# Patient Record
Sex: FEMALE | NOT HISPANIC OR LATINO | Employment: FULL TIME | URBAN - METROPOLITAN AREA
[De-identification: names, ages, dates, MRNs, and addresses within clinical notes are randomized per-mention and may not be internally consistent; named-entity substitution may affect disease eponyms.]

---

## 2023-08-31 PROBLEM — N92.6 IRREGULAR MENSES: Status: ACTIVE | Noted: 2023-08-31

## 2023-08-31 PROBLEM — N94.6 DYSMENORRHEA: Status: ACTIVE | Noted: 2023-08-31

## 2023-08-31 PROBLEM — N92.0 MENORRHAGIA WITH REGULAR CYCLE: Status: ACTIVE | Noted: 2023-08-31

## 2023-08-31 PROBLEM — N92.1 BREAKTHROUGH BLEEDING ON OCPS: Status: ACTIVE | Noted: 2023-08-31

## 2023-08-31 RX ORDER — NORETHINDRONE ACETATE AND ETHINYL ESTRADIOL .02; 1 MG/1; MG/1
1 TABLET ORAL DAILY
COMMUNITY
Start: 2022-10-25

## 2023-08-31 RX ORDER — SPIRONOLACTONE 100 MG/1
1 TABLET, FILM COATED ORAL DAILY
COMMUNITY

## 2023-11-09 ENCOUNTER — OFFICE VISIT (OUTPATIENT)
Dept: OBSTETRICS AND GYNECOLOGY | Facility: CLINIC | Age: 23
End: 2023-11-09
Payer: COMMERCIAL

## 2023-11-09 VITALS
DIASTOLIC BLOOD PRESSURE: 68 MMHG | WEIGHT: 267 LBS | BODY MASS INDEX: 45.58 KG/M2 | HEIGHT: 64 IN | SYSTOLIC BLOOD PRESSURE: 110 MMHG

## 2023-11-09 DIAGNOSIS — R03.0 ELEVATED BLOOD PRESSURE READING WITHOUT DIAGNOSIS OF HYPERTENSION: ICD-10-CM

## 2023-11-09 DIAGNOSIS — Z30.41 SURVEILLANCE OF PREVIOUSLY PRESCRIBED CONTRACEPTIVE PILL: ICD-10-CM

## 2023-11-09 DIAGNOSIS — Z01.419 WELL WOMAN EXAM WITH ROUTINE GYNECOLOGICAL EXAM: Primary | ICD-10-CM

## 2023-11-09 PROCEDURE — 3008F BODY MASS INDEX DOCD: CPT | Performed by: OBSTETRICS & GYNECOLOGY

## 2023-11-09 PROCEDURE — 1036F TOBACCO NON-USER: CPT | Performed by: OBSTETRICS & GYNECOLOGY

## 2023-11-09 PROCEDURE — 99395 PREV VISIT EST AGE 18-39: CPT | Performed by: OBSTETRICS & GYNECOLOGY

## 2023-11-09 RX ORDER — NORETHINDRONE ACETATE AND ETHINYL ESTRADIOL, ETHINYL ESTRADIOL AND FERROUS FUMARATE 1MG-10(24)
1 KIT ORAL DAILY
Qty: 84 TABLET | Refills: 3 | Status: SHIPPED | OUTPATIENT
Start: 2023-11-09 | End: 2024-11-08

## 2023-11-09 RX ORDER — FLUOXETINE 10 MG/1
1 CAPSULE ORAL
COMMUNITY
Start: 2023-05-01

## 2023-11-09 ASSESSMENT — LIFESTYLE VARIABLES
HOW MANY STANDARD DRINKS CONTAINING ALCOHOL DO YOU HAVE ON A TYPICAL DAY: 1 OR 2
SKIP TO QUESTIONS 9-10: 1
HOW OFTEN DO YOU HAVE SIX OR MORE DRINKS ON ONE OCCASION: NEVER
AUDIT-C TOTAL SCORE: 1
HOW OFTEN DO YOU HAVE A DRINK CONTAINING ALCOHOL: MONTHLY OR LESS

## 2023-11-09 ASSESSMENT — PATIENT HEALTH QUESTIONNAIRE - PHQ9
SUM OF ALL RESPONSES TO PHQ9 QUESTIONS 1 & 2: 0
2. FEELING DOWN, DEPRESSED OR HOPELESS: NOT AT ALL
1. LITTLE INTEREST OR PLEASURE IN DOING THINGS: NOT AT ALL

## 2023-11-09 NOTE — PROGRESS NOTES
"Annual  Subjective   Batool ROWDY Henley is a 23 y.o. female who is here for a gyn exam and ocp eval; last seen 01/23 for med chk; increased from lo=loestrin to loestrin 24 to resolve btb; pt reports menses reg/light/NO further btb.   Complaints:denies h/a, leg pains, or known liver dz.  Gyn History:  LMP 11/08/2023  Last pap smear date 02/23/2022-neg, hpv-neg, 01/12/2021-ASCUS,hpv-neg.   Birth control OCPs=Loestrin 24.   Periods : every month, light blood loss.   Menarche 9.   STDs Never.   gardasil yes.   Sexual activity currently sexually active; reports same partner. DECLINES std screen.  Total pregnancies  0.   Review of Systems   Constitutional:  Negative for activity change, fatigue and unexpected weight change.   HENT:  Negative for hearing loss.    Respiratory:  Negative for chest tightness and shortness of breath.    Cardiovascular:  Negative for chest pain and leg swelling.   Gastrointestinal:  Negative for abdominal distention and abdominal pain.   Genitourinary:  Negative for difficulty urinating, dysuria, genital sores, pelvic pain, vaginal bleeding, vaginal discharge and vaginal pain.   Musculoskeletal:  Negative for gait problem and joint swelling.   Skin:  Negative for color change and rash.   Neurological:  Negative for dizziness, weakness and headaches.   Hematological:  Negative for adenopathy.   Objective   /68   Ht 1.626 m (5' 4\")   Wt 121 kg (267 lb)   LMP 11/08/2023 (Exact Date)   BMI 45.83 kg/m²    General:   Alert and oriented, in no acute distress   Neck: Supple. No visible thyromegaly.    Breast/Axilla: Normal to palpation bilaterally without masses, skin changes, or nipple discharge.    Abdomen: Soft, non-tender, without masses or organomegaly   Vulva: Normal architecture without erythema, masses, or lesions.    Vagina: Normal mucosa without lesions, masses, or atrophy. No abnormal vaginal discharge.    Cervix: Normal without masses, lesions, or signs of cervicitis   Uterus:  " Grossly Normal, mobile, non-enlarged uterus; exam limited by bmi   Adnexa: No palpable  masses or tenderness; exam limited by bmi.   Pelvic Floor normal   Psych Normal affect. Normal mood.      Assessment/Plan    Encounter Diagnoses   Name Primary?    Well woman exam with routine gynecological exam; grossly nl breast exam ; gyn exam  grossly nl but limited by adiposity. Yes    Surveillance of previously prescribed contraceptive pill; btb resolved on 20 mcg 24/4/ regimen; no known contraindications.     BMI 45.0-49.9, adult (CMS/HCC); encouraged resumpiton wt loss efforts      Elevated blood pressure reading without diagnosis of hypertension; nl today; spironolactone helps to control; wt loss encouraged      Nu Flor MD

## 2023-11-10 PROBLEM — Z01.419 WELL WOMAN EXAM WITH ROUTINE GYNECOLOGICAL EXAM: Status: ACTIVE | Noted: 2023-11-10

## 2023-11-10 PROBLEM — R03.0 ELEVATED BLOOD PRESSURE READING WITHOUT DIAGNOSIS OF HYPERTENSION: Status: ACTIVE | Noted: 2023-11-10

## 2023-11-10 ASSESSMENT — ENCOUNTER SYMPTOMS
ADENOPATHY: 0
ABDOMINAL PAIN: 0
HEADACHES: 0
FATIGUE: 0
DIFFICULTY URINATING: 0
ABDOMINAL DISTENTION: 0
SHORTNESS OF BREATH: 0
DYSURIA: 0
DIZZINESS: 0
CHEST TIGHTNESS: 0
UNEXPECTED WEIGHT CHANGE: 0
ACTIVITY CHANGE: 0
COLOR CHANGE: 0
JOINT SWELLING: 0
WEAKNESS: 0

## 2023-11-27 ENCOUNTER — APPOINTMENT (OUTPATIENT)
Dept: OBSTETRICS AND GYNECOLOGY | Facility: CLINIC | Age: 23
End: 2023-11-27
Payer: COMMERCIAL